# Patient Record
Sex: FEMALE | Race: WHITE | HISPANIC OR LATINO | URBAN - METROPOLITAN AREA
[De-identification: names, ages, dates, MRNs, and addresses within clinical notes are randomized per-mention and may not be internally consistent; named-entity substitution may affect disease eponyms.]

---

## 2021-10-05 ENCOUNTER — APPOINTMENT (RX ONLY)
Dept: URBAN - METROPOLITAN AREA CLINIC 95 | Facility: CLINIC | Age: 36
Setting detail: DERMATOLOGY
End: 2021-10-05

## 2021-10-05 DIAGNOSIS — L82.1 OTHER SEBORRHEIC KERATOSIS: ICD-10-CM

## 2021-10-05 PROCEDURE — ? COUNSELING

## 2021-10-05 PROCEDURE — ? FULL BODY SKIN EXAM - DECLINED

## 2021-10-05 PROCEDURE — ? OBSERVATION

## 2021-10-05 PROCEDURE — ? PHOTO-DOCUMENTATION

## 2021-10-05 PROCEDURE — ? TREATMENT REGIMEN

## 2021-10-05 PROCEDURE — 99202 OFFICE O/P NEW SF 15 MIN: CPT

## 2021-10-05 PROCEDURE — ? ADDITIONAL NOTES

## 2021-10-05 ASSESSMENT — LOCATION SIMPLE DESCRIPTION DERM: LOCATION SIMPLE: LEFT CHEEK

## 2021-10-05 ASSESSMENT — LOCATION DETAILED DESCRIPTION DERM: LOCATION DETAILED: LEFT CENTRAL MANDIBULAR CHEEK

## 2021-10-05 ASSESSMENT — LOCATION ZONE DERM: LOCATION ZONE: FACE

## 2022-03-04 ENCOUNTER — APPOINTMENT (RX ONLY)
Dept: URBAN - METROPOLITAN AREA CLINIC 95 | Facility: CLINIC | Age: 37
Setting detail: DERMATOLOGY
End: 2022-03-04

## 2022-03-04 DIAGNOSIS — D485 NEOPLASM OF UNCERTAIN BEHAVIOR OF SKIN: ICD-10-CM

## 2022-03-04 DIAGNOSIS — Z41.9 ENCOUNTER FOR PROCEDURE FOR PURPOSES OTHER THAN REMEDYING HEALTH STATE, UNSPECIFIED: ICD-10-CM

## 2022-03-04 PROBLEM — D48.5 NEOPLASM OF UNCERTAIN BEHAVIOR OF SKIN: Status: ACTIVE | Noted: 2022-03-04

## 2022-03-04 PROCEDURE — ? COUNSELING

## 2022-03-04 PROCEDURE — ? FULL BODY SKIN EXAM - DECLINED

## 2022-03-04 PROCEDURE — 99212 OFFICE O/P EST SF 10 MIN: CPT

## 2022-03-04 PROCEDURE — ? ADDITIONAL NOTES

## 2022-03-04 PROCEDURE — ? DERMTECH: PLA + TERT ADD ON ASSAY

## 2022-03-04 ASSESSMENT — LOCATION DETAILED DESCRIPTION DERM: LOCATION DETAILED: LEFT CENTRAL MANDIBULAR CHEEK

## 2022-03-04 ASSESSMENT — LOCATION ZONE DERM: LOCATION ZONE: FACE

## 2022-03-04 ASSESSMENT — LOCATION SIMPLE DESCRIPTION DERM: LOCATION SIMPLE: LEFT CHEEK

## 2022-03-04 NOTE — PROCEDURE: ADDITIONAL NOTES
Additional Notes: Patient was given the contact information for Latrice at our [place] office for a cosmetic consultation to discuss fillers under her eyes. Additional Notes: Patient was given the contact information for Latrice at our [place] office for a cosmetic consultation to discuss fillers under her eyes.

## 2022-03-04 NOTE — PROCEDURE: ADDITIONAL NOTES
Additional Notes: Patient consent was obtained to proceed with the visit and recommended plan of care after discussion of all risks and benefits, including the risk of COVID-19 exposure.